# Patient Record
Sex: FEMALE | Race: ASIAN
[De-identification: names, ages, dates, MRNs, and addresses within clinical notes are randomized per-mention and may not be internally consistent; named-entity substitution may affect disease eponyms.]

---

## 2017-01-23 ENCOUNTER — HOSPITAL ENCOUNTER (OUTPATIENT)
Dept: HOSPITAL 80 - CIMAGING | Age: 27
End: 2017-01-23
Attending: OBSTETRICS & GYNECOLOGY
Payer: COMMERCIAL

## 2017-01-23 DIAGNOSIS — N83.202: Primary | ICD-10-CM

## 2017-01-23 NOTE — US
Complete Pelvic Ultrasound



INDICATION:  Check lining and complex left ovarian cyst.



TECHNIQUE:  Transabdominal and transvaginal pelvic ultrasound is performed.



Comparison:  No priors for comparison.



FINDINGS:  The uterus measures 8.9 x 4.5 x 5.6 cm.  No fibroids.  It is anteverted.  The endometrial 
lining measures 6.8 mm, is homogeneous, and is without mass or vascularity.  No fluid in the lining. 
 Junctional zone is homogeneous.



The right ovary measures 2.6 x 1.6 x 3 cm.  It is sonographically normal.



The left ovary measures 3.9 x 3 x 4.5 cm.  Complex left ovarian cyst measures 2.8 x 2.3 x 2.2 cm, and
 is without color Doppler flow.



No free fluid in the pelvis.



IMPRESSIONS

1.  Normal right ovary and uterus.

2.  Complex left ovarian cyst, without vascularity.  This probably represents a hemorrhagic ovarian c
yst.



E:amm

## 2017-12-31 ENCOUNTER — HOSPITAL ENCOUNTER (OUTPATIENT)
Dept: HOSPITAL 80 - FLD | Age: 27
Setting detail: OBSERVATION
Discharge: HOME | End: 2017-12-31
Attending: OBSTETRICS & GYNECOLOGY | Admitting: OBSTETRICS & GYNECOLOGY
Payer: COMMERCIAL

## 2017-12-31 DIAGNOSIS — Z34.83: Primary | ICD-10-CM

## 2017-12-31 DIAGNOSIS — Z3A.39: ICD-10-CM

## 2017-12-31 PROCEDURE — G0378 HOSPITAL OBSERVATION PER HR: HCPCS

## 2017-12-31 PROCEDURE — 59025 FETAL NON-STRESS TEST: CPT

## 2018-01-02 ENCOUNTER — HOSPITAL ENCOUNTER (INPATIENT)
Dept: HOSPITAL 80 - FLD | Age: 28
LOS: 3 days | Discharge: HOME | End: 2018-01-05
Attending: OBSTETRICS & GYNECOLOGY | Admitting: OBSTETRICS & GYNECOLOGY
Payer: COMMERCIAL

## 2018-01-02 DIAGNOSIS — O48.0: Primary | ICD-10-CM

## 2018-01-02 DIAGNOSIS — Z3A.40: ICD-10-CM

## 2018-01-02 DIAGNOSIS — O41.1230: ICD-10-CM

## 2018-01-02 LAB — PLATELET # BLD: 168 10^3/UL (ref 150–400)

## 2018-01-02 PROCEDURE — 0KQM0ZZ REPAIR PERINEUM MUSCLE, OPEN APPROACH: ICD-10-PCS | Performed by: OBSTETRICS & GYNECOLOGY

## 2018-01-02 RX ADMIN — Medication PRN MCG: at 21:53

## 2018-01-02 RX ADMIN — Medication PRN MCG: at 21:56

## 2018-01-02 RX ADMIN — Medication PRN MCG: at 22:24

## 2018-01-02 RX ADMIN — Medication PRN MCG: at 22:00

## 2018-01-02 RX ADMIN — Medication PRN MCG: at 22:13

## 2018-01-02 NOTE — GHP
[f rep st]



                                                       PREOP HISTORY AND PHYSICAL





DATE OF ADMISSION:  2018



ADMISSION DIAGNOSES:  

1.  Intrauterine pregnancy at 40 and 1/7th weeks' gestation.  

2.  Early labor.



HISTORY OF PRESENT ILLNESS:  Patient is a 27-year-old  3, para 0-0-2-0, who is 40 and 1/7th we
eks' gestation.  Her estimated date of confinement is 2018, dated by last menstrual period of 0
3/22/2017, consistent with a 6-week ultrasound.  Patient initiated prenatal care with [facility]
 [facility] at 8 weeks.  Pregnancy was initially complicated by low progesterone and some vaginal bleeding.
  She was treated with vaginal progesterone.  The remainder of pregnancy has been overall unremarkabl
e.  Patient has been in prodromal labor for the last day and a half.  [facility], and was 1 cm dilated.  She has had contractions, which feel like they are increasing
 in frequency and intensity throughout the day, and arrived to Labor and Delivery, and was 1 cm dilat
ed still.  Contractions have been getting progressively stronger and more intense, and patient was ju
st re-examined, and was 3 cm dilated, 80% effaced, and -2 station.  The patient is being admitted for
 expected management and pain relief.



MEDICAL HISTORY:  Unremarkable.



MEDICATIONS:  Prenatal vitamins and iron.



SURGICAL HISTORY:  Dilation and curettage after a missed .



ALLERGIES:  No known drug allergies.



SOCIAL HISTORY:  Patient is a homemaker.  She denies tobacco, alcohol, or drug use.  She is .



FAMILY MEDICAL HISTORY:  Noncontributory.



OBSTETRICAL/GYNECOLOGICAL HISTORY:  Menarche, age 11.  Periods every 30 days, lasting 5 days.  She is
 a  3, para 0-0-2-0.  In 2015, she had a missed , for which she had a dilatio
n and curettage.  In 2016, she had a spontaneous .  Current pregnancy has been uncom
plicated.  The patient denies any history of any abnormal Pap smears or sexually-transmitted diseases
.



REVIEW OF SYSTEMS:  10-point review of systems is negative,with the exception of the above-mentioned 
pertinent positives.  She is having positive contractions.  No loss of fluid.  Denies any vaginal ble
eding.  She denies any headache or changes in vision, nausea, vomiting, fevers or chills.  She denies
 the other concerns.



PHYSICAL EXAMINATION:  VITAL SIGNS:  Stable.  GENERAL APPEARANCE:  Alert and oriented x3, but uncomfo
rtable with contractions.  PSYCH:  She has appropriate affect.  NECK:  Mobile and supple.  HEART:  Ra
te is regularly regular.  LUNGS:  Clear to auscultation bilaterally.  Abdomen is gravid, nondistended
, nontender.  EXTREMITIES:  Reveal no calf tenderness or edema.  NEUROMUSCULAR:  Grossly intact.  DESTINEE
ROLOGICAL:  Exam is grossly intact.  PELVIC:  She is 3 cm dilated, 80% effaced, and -2 station.  Feta
l heart tracing is category 1, and she is having irregular contractions.



PRENATAL LABS:  Blood type A/B positive, antibody screen negative.  Rubella immune.  GBS negative.  H
BsAg negative.  HIV negative.  Her 50 g glucose was 113.  She had a negative Verifi screen.



ASSESSMENT AND PLAN:  A 27-year-old  3, para 0-0-2-0, who is 40 and 1/7th weeks' gestation, wh
o presents in early labor.  Patient is requesting an epidural.  She will receive an epidural, and be 
augmented as needed.





Job #:  [id number]

## 2018-01-02 NOTE — OBPROG
Labor Progress Note


Assessment/Plan: 


Assessment:


























Plan:





Subjective/Intrapartum Course: 





01/02/18 21:53


patient comfortable with epidural.   contractions have spaced out a bit.  

patient is exhausted.   will reexamine in several hours and augment if needed.  

fetal status is reassuring.   


Objective: 





 





 01/02/18 19:55 





 











Patient ABO/Rh  AB POSITIVE   01/02/18  19:55    














- SVE


Membranes: Intact





- Contraction Pattern Assessment


Current Contraction Pattern: Regular





- FHR Assessment


  ** Peter


FHR Pattern Variability: Moderate


FHR Category: 1





- AP


Antepartum Course: 





01/02/18 22:02


initiated prenatal care in first trimester at [facility]. uncomplicated pregnancy.   

prodromal labor x 2 day.  arrived 1 cm.  progressed to 3 cm.   requested 

epidural.  





Oxytocin Orders Assessment





- Pre-Induction/Augmentation Assessment


Gestational Age: 40 week(s) and 1 day(s)





ICD10 Worksheet


Patient Problems: 


 Problems











Problem Status Onset


 


Normal labor Acute

## 2018-01-02 NOTE — PDANEPAE
ANE History of Present Illness





Pt in active labor





ANE Past Medical History


Past Medical History: 





Pt denies significant PMHx





- Pulmonary History


Hx Sleep Apnea: No





- Endocrine History


Hx Diabetes: No





ANE Review of Systems


Review of systems is: negative


Review of Systems: 








ANE Patient History





- Allergies


Allergies/Adverse Reactions: 








No Allergies [NKDA] Allergy (Verified 12/31/17 05:41)


 








- Home Medications


Home medications: home medication list seen and reviewed


Home Medications: 








IRON,CARBONYL [IRON] 45 mg PO 01/02/18 [Last Taken 2 Days Ago ~12/31/17]


Prenatal Vit27&Calcium/Iron/FA [Prenatal Rx 1 Tablet (RX)] 1 each PO DAILY 01/02 /18 [Last Taken 01/02/18]








- Smoking Hx


Smoking Status: Never smoked





ANE Labs/Vital Signs





- Labs


Result Diagrams: 


 01/02/18 19:55








- Vital Signs


Height: 165 cm


Weight: 74.843 kg





ANE Physical Exam





- Airway


Neck exam: FROM





ANE Anesthesia Plan


Anesthesia Plan: epidural

## 2018-01-03 RX ADMIN — GENTAMICIN SULFATE SCH MLS: 1 INJECTION, SOLUTION INTRAVENOUS at 17:12

## 2018-01-03 RX ADMIN — DOCUSATE SODIUM PRN MG: 100 CAPSULE, LIQUID FILLED ORAL at 21:26

## 2018-01-03 RX ADMIN — AMPICILLIN SODIUM SCH MLS: 1 INJECTION, POWDER, FOR SOLUTION INTRAMUSCULAR; INTRAVENOUS at 08:55

## 2018-01-03 RX ADMIN — AMPICILLIN SODIUM SCH MLS: 2 INJECTION, POWDER, FOR SOLUTION INTRAMUSCULAR; INTRAVENOUS at 15:29

## 2018-01-03 RX ADMIN — AMPICILLIN SODIUM SCH MLS: 2 INJECTION, POWDER, FOR SOLUTION INTRAMUSCULAR; INTRAVENOUS at 21:26

## 2018-01-03 RX ADMIN — AMPICILLIN SODIUM SCH MLS: 1 INJECTION, POWDER, FOR SOLUTION INTRAMUSCULAR; INTRAVENOUS at 09:05

## 2018-01-03 RX ADMIN — IBUPROFEN PRN MG: 600 TABLET ORAL at 23:34

## 2018-01-03 RX ADMIN — GENTAMICIN SULFATE SCH MLS: 1 INJECTION, SOLUTION INTRAVENOUS at 09:14

## 2018-01-03 RX ADMIN — IBUPROFEN PRN MG: 600 TABLET ORAL at 17:13

## 2018-01-03 NOTE — OBDEL
Birth Info


Birth Type: Vaginal


Presentation at Delivery: Vertex


L&D Analgesia/Anesthesia Type: Epidural


GBS+: No


Intrapartum Medications: 





 











Generic Name Dose Route Start Last Admin





  Trade Name Freq  PRN Reason Stop Dose Admin


 


Lactated Ringer's  1,000 mls @ 0 mls/hr  18 15:38  18 09:00





  Lr  IV  18 15:37  1,000 mls





  PRN PRN   Administration





  SEE PROTOCOL CONDITIONS   





  Protocol   





  Per Protocol   


 


Gentamicin Sulfate/Sodium Chloride  100 mls @ 100 mls/hr  18 09:00   09:14





  Gentamicin 100 Mg (Premix)  IV  18 08:59  100 mls





  Q8H ZAY   Administration


 


Ampicillin Sodium 1 gm/ Sodium  50 mls @ 100 mls/hr  18 09:00  18 09

:05





  Chloride  IV  18 09:59  50 mls





  Q30M ZAY   Administration


 


Phenylephrine HCl  100 mcg  18 21:04  18 22:24





  Neosynephrine  IVP  18 21:03  100 mcg





  .Q2M PRN   Administration





  Hypotension   














Discontinued Medications














Generic Name Dose Route Start Last Admin





  Trade Name Freq  PRN Reason Stop Dose Admin


 


Oxytocin 20 unit/ Lactated  1,002 mls @ 150 mls/hr  18 15:38  18 09:

18





  Ringer's  IV   1,002 mls





  PRN PRN   Administration





  Post-partum bleeding   














- Infant Care Provider


Pediatrician/NNP: Diann Syed





- Hospital Course


Intrapartum: 





18 21:53


patient comfortable with epidural.   contractions have spaced out a bit.  

patient is exhausted.   will reexamine in several hours and augment if needed.  

fetal status is reassuring.   


18 02:19


patient comfortable with epidural.   Nurse Kosta examined patient at 0100.  

membranes grossly ruptured.  SVE 4/100/-1.  will recheck in 2 hours and augment 

if needed.  


Indications for Delivery: Spontaneous Labor





Vaginal Delivery





- Delivery Provider


Delivery Physician/CNM: Stephie Ye





- Labor and Delivery


Onset of Contractions Date: 18


Onset of Contractions Time: 22:00


Onset of Contractions Type: Spontaneous


Rupture of Membranes Date: 18


Rupture of Membranes Time: 01:00


Rupture of Membranes Type: Spontaneous


Amniotic Fluid Color: Clear, Meconium Stained (fluid noted around 0600 when pt 

was complete and started pushing)


Dilation Complete Date: 18


Dilation Complete Time: 06:15


Placenta Delivery Date: 18


Placenta Delivery Time: 09:04


Total Hours of Labor: 35


Non-surgical Procedures: Amniotomy


Laceration: 2nd Degree, Other (Specify) (b/l vaginal lacs)


Repair: 3-0, Vicryl (x 3)


Vaginal Sponge Count Correct: Yes


Vaginal Needle Count Correct: Yes


Vaginal Sweep Performed: Yes


EBL: 500 cc


Delivery Events: None


Delivery Comment: 





Pt spiked a fever T max 101 while pushing and was treated for chorio with amp/

gent x 24 hrs








[place] Birth Data


GEORGIE: 18


Gestational Age: 40 week(s) and 2 day(s)


  ** [place]


Delivery Date: 18


Delivery Time: 09:00


Sex of Infant: Female


Apgar Score (1 Min): 8


Apgar Score (5 Min): 9





ICD10 Worksheet


Patient Problems: 


 Problems











Problem Status Onset


 


Chorioamnionitis, delivered, current hospitalization Acute  


 


Normal labor Acute  


 


 (spontaneous vaginal delivery) Acute  














- ICD10 Problem Qualifiers


(1) Chorioamnionitis, delivered, current hospitalization








(2)  (spontaneous vaginal delivery)

## 2018-01-03 NOTE — OBPROG
Labor Progress Note


Assessment/Plan: 


Assessment:


























Plan:





Subjective/Intrapartum Course: 





01/02/18 21:53


patient comfortable with epidural.   contractions have spaced out a bit.  

patient is exhausted.   will reexamine in several hours and augment if needed.  

fetal status is reassuring.   


01/03/18 02:19


patient comfortable with epidural.   Nurse Kosta examined patient at 0100.  

membranes grossly ruptured.  SVE 4/100/-1.  will recheck in 2 hours and augment 

if needed.  


Objective: 





 





 01/02/18 19:55 





 











Patient ABO/Rh  AB POSITIVE   01/02/18  19:55    














- SVE


Dilation (cm): 4


Effacement (%): 100


Station: -1


Membranes: SROM


Amniotic Fluid Color: Clear





- Contraction Pattern Assessment


Current Contraction Pattern: Regular





- FHR Assessment


  ** Peter


FHR Pattern Variability: Moderate


FHR Category: 1





- AP


Antepartum Course: 





01/02/18 22:02


initiated prenatal care in first trimester at [facility]. uncomplicated pregnancy.   

prodromal labor x 2 day.  arrived 1 cm.  progressed to 3 cm.   requested 

epidural.  





Oxytocin Orders Assessment





- Pre-Induction/Augmentation Assessment


Gestational Age: 40 week(s) and 1 day(s)





ICD10 Worksheet


Patient Problems: 


 Problems











Problem Status Onset


 


Normal labor Acute

## 2018-01-04 VITALS
OXYGEN SATURATION: 95 % | SYSTOLIC BLOOD PRESSURE: 89 MMHG | HEART RATE: 81 BPM | DIASTOLIC BLOOD PRESSURE: 54 MMHG | TEMPERATURE: 97.2 F

## 2018-01-04 VITALS — RESPIRATION RATE: 16 BRPM

## 2018-01-04 LAB — PLATELET # BLD: 140 10^3/UL (ref 150–400)

## 2018-01-04 RX ADMIN — AMPICILLIN SODIUM SCH: 2 INJECTION, POWDER, FOR SOLUTION INTRAMUSCULAR; INTRAVENOUS at 22:01

## 2018-01-04 RX ADMIN — IBUPROFEN PRN MG: 600 TABLET ORAL at 18:18

## 2018-01-04 RX ADMIN — AMPICILLIN SODIUM SCH: 2 INJECTION, POWDER, FOR SOLUTION INTRAMUSCULAR; INTRAVENOUS at 16:19

## 2018-01-04 RX ADMIN — GENTAMICIN SULFATE SCH MLS: 1 INJECTION, SOLUTION INTRAVENOUS at 01:18

## 2018-01-04 RX ADMIN — GENTAMICIN SULFATE SCH MLS: 1 INJECTION, SOLUTION INTRAVENOUS at 10:24

## 2018-01-04 RX ADMIN — IBUPROFEN PRN MG: 600 TABLET ORAL at 06:14

## 2018-01-04 RX ADMIN — AMPICILLIN SODIUM SCH MLS: 2 INJECTION, POWDER, FOR SOLUTION INTRAMUSCULAR; INTRAVENOUS at 03:27

## 2018-01-04 RX ADMIN — Medication SCH MG: at 21:43

## 2018-01-04 RX ADMIN — GENTAMICIN SULFATE SCH: 1 INJECTION, SOLUTION INTRAVENOUS at 16:19

## 2018-01-04 RX ADMIN — DOCUSATE SODIUM PRN MG: 100 CAPSULE, LIQUID FILLED ORAL at 21:43

## 2018-01-04 RX ADMIN — IBUPROFEN PRN MG: 600 TABLET ORAL at 12:15

## 2018-01-04 RX ADMIN — AMPICILLIN SODIUM SCH MLS: 2 INJECTION, POWDER, FOR SOLUTION INTRAMUSCULAR; INTRAVENOUS at 09:40

## 2018-01-04 RX ADMIN — DOCUSATE SODIUM PRN MG: 100 CAPSULE, LIQUID FILLED ORAL at 12:15

## 2018-01-04 NOTE — OBPP
PostPartum Progress Note


Assessment/Plan: 


Assessment:





[id number] 


s/p 


PPD#1


PPH


breastfeeding











Plan:


routine pp care


CBC pending


cont breastfeeding


ambulate 


plan to d/c home tomorrow





18 08:45





18 08:47





Subjective/Postpartum Course: 





18 11:01


pt doing well, denies any heavy bleeding or pain. She is breastfeeding. She is 

voiding and ambulating without difficulty. FOB is supportive.


Objective: 





 





 18 19:55 





 18 14:20 





 











Patient ABO/Rh  AB POSITIVE   18  19:55    








 











Temp Pulse Resp BP Pulse Ox


 


 36.9 C   91   18   83/55 L  96 


 


 18 08:00  18 08:00  18 08:00  18 08:00  18 08:00











Uterine Position/Fundal Height: Umbilicus -1, Midline


Uterine Tone: Firm





PostPartum Physical Exam





- Physical Exam


Neck: supple


Respiratory: lungs clear, normal breath sounds


Cardiac/Chest: regular rate, rhythm


Abdomen: non-tender, soft


Extremities: non-tender


Skin: normal color, warm/dry


Neuro/Psych: alert, normal mood/affect, oriented x 3

## 2018-01-05 RX ADMIN — Medication SCH MG: at 09:05

## 2018-01-05 RX ADMIN — IBUPROFEN PRN MG: 600 TABLET ORAL at 00:18

## 2018-01-05 RX ADMIN — IBUPROFEN PRN MG: 600 TABLET ORAL at 07:31

## 2018-01-05 RX ADMIN — DOCUSATE SODIUM PRN MG: 100 CAPSULE, LIQUID FILLED ORAL at 09:05

## 2018-01-05 NOTE — OBGCSDC
General Delivery Information





- General Info


: 1


Para: 1


Abortions: 0


Birth Type: Vaginal


L&D Analgesia/Anesthesia Type: Epidural


Admission Date: 18


Labs: 





 











Patient ABO/Rh  AB POSITIVE   18  19:55    


 


Hct  24.2 % (38.0-47.0)  L D 18  14:30    














- Hospital Course


Antepartum: 





18 22:02


initiated prenatal care in first trimester at [facility]. uncomplicated pregnancy.   

prodromal labor x 2 day.  arrived 1 cm.  progressed to 3 cm.   requested 

epidural.  


Intrapartum: 





18 21:53


patient comfortable with epidural.   contractions have spaced out a bit.  

patient is exhausted.   will reexamine in several hours and augment if needed.  

fetal status is reassuring.   


18 02:19


patient comfortable with epidural.   Nurse Kosta examined patient at 0100.  

membranes grossly ruptured.  SVE 4/100/-1.  will recheck in 2 hours and augment 

if needed.  


Postpartum: 





18 11:01


pt doing well, denies any heavy bleeding or pain. She is breastfeeding. She is 

voiding and ambulating without difficulty. FOB is supportive.


18 10:07


Pt is doing well this am.  She is nursing, ambulating and voiding without 

difficulty.  They are ready to d.c home.





Vaginal Birth





- Delivery Provider


Delivery Physician/CNM: Stephie Ye





- Diagnosis


Labor: Spontaneous


Rupture of Membranes Type: Spontaneous


Amniotic Fluid Color: Clear, Meconium Stained (fluid noted around 0600 when pt 

was complete and started pushing)


Laceration: 2nd Degree, Other (Specify) (b/l vaginal lacs)


Repair: 3-0, Vicryl (x 3)


Delivery Events: None





- Procedures


Non-surgical Procedures: Amniotomy





 Birth





-  Delivery


Non-surgical Procedures: Amniotomy


EBL: 500 cc





 Birth Data


GEORGIE: 18


Gestational Age: 40 week(s) and 4 day(s)


  ** Peter


Delivery Date: 18


Delivery Time: 09:00


Sex of Infant: Female


Apgar Score (1 Min): 8


Apgar Score (5 Min): 9





Discharge Information





- Discharge Information


Prescriptions: 


Ibuprofen [Motrin (*)] 600 mg PO Q6HRS PRN #30 tab


 PRN Reason: Pain, Inflammatory


Iron Polysacch/Iron Heme Polyp [Bifera] 28 mg PO TID #90 tab


Condition: Good


Instruction/Follow Up: Four Weeks, Six Weeks

## 2018-01-05 NOTE — OBPP
PostPartum Progress Note


Assessment/Plan: 


Assessment:


26 y/o PPD #2 s/p  doing well with post partum anemia























Plan:


D/c home today with Bifera, PNV.  Lactations support.  Follow-up @ [facility] 4 and 6 

weeks. 


18 10:08





Subjective/Postpartum Course: 





18 11:01


pt doing well, denies any heavy bleeding or pain. She is breastfeeding. She is 

voiding and ambulating without difficulty. FOB is supportive.


18 10:07


Pt is doing well this am.  She is nursing, ambulating and voiding without 

difficulty.  They are ready to d.c home.


Objective: 





 





 18 14:30 





 18 14:20 





 











Patient ABO/Rh  AB POSITIVE   18  19:55    








 











Temp Pulse Resp BP Pulse Ox


 


 36.2 C   81   16   89/54 L  95 


 


 18 19:52  18 19:52  18 19:52  18 19:52  18 19:52











Uterine Position/Fundal Height: Umbilicus -2


Uterine Tone: Firm





PostPartum Physical Exam





- Physical Exam


Neck: non-tender, full range of motion, supple


Respiratory: chest non-tender, lungs clear, normal breath sounds


Cardiac/Chest: regular rate, rhythm


Abdomen: normal bowel sounds


Extremities: swelling (1+), Juliet's sign (neg)